# Patient Record
Sex: MALE | Race: BLACK OR AFRICAN AMERICAN | NOT HISPANIC OR LATINO | URBAN - METROPOLITAN AREA
[De-identification: names, ages, dates, MRNs, and addresses within clinical notes are randomized per-mention and may not be internally consistent; named-entity substitution may affect disease eponyms.]

---

## 2019-11-07 ENCOUNTER — EMERGENCY (EMERGENCY)
Facility: HOSPITAL | Age: 49
LOS: 1 days | Discharge: HOME | End: 2019-11-07
Admitting: EMERGENCY MEDICINE
Payer: COMMERCIAL

## 2019-11-07 VITALS
RESPIRATION RATE: 20 BRPM | DIASTOLIC BLOOD PRESSURE: 85 MMHG | OXYGEN SATURATION: 99 % | TEMPERATURE: 98 F | HEART RATE: 87 BPM | SYSTOLIC BLOOD PRESSURE: 150 MMHG

## 2019-11-07 PROCEDURE — 99282 EMERGENCY DEPT VISIT SF MDM: CPT

## 2019-11-07 NOTE — ED PROVIDER NOTE - CLINICAL SUMMARY MEDICAL DECISION MAKING FREE TEXT BOX
Pt's BP elevated in ED, repeat in exam room was 152/92. Pt offered to start low dose BP med such as novasc but declines at this time, states he will see his PMD. Pt educated regarding low salt healthy diet, weight loss/exercise and reducing alcohol intake. Pt states he has a vegetarian diet but does occasionally drink alcohol.

## 2019-11-07 NOTE — ED PROVIDER NOTE - OBJECTIVE STATEMENT
48 Y/O M denies PMH states that " I was here with somebody else and wanted to have my blood pressure checked." Pt denies CP SOB ABD PN N V D Dizz or any other acute symptoms or complaints. Pt states his blood pressure has always been normal, pt states he last saw his PMD in April.

## 2019-11-07 NOTE — ED PROVIDER NOTE - NSFOLLOWUPINSTRUCTIONS_ED_ALL_ED_FT
Your blood pressure was elevated while you were in the ER. Discuss this with your primary doctor. Advance activity as tolerated.  Continue all previously prescribed medications as directed.  Follow up with your primary care physician in 48-72 hours- bring copies of your results.  Return to the ER for worsening or persistent symptoms, and/or ANY NEW OR CONCERNING SYMPTOMS. If you have issues obtaining follow up, please call: [phone] to obtain a doctor or specialist who takes your insurance in your area.  You may call [phone] to make an appointment with the internal medicine clinic.

## 2019-11-07 NOTE — ED PROVIDER NOTE - PATIENT PORTAL LINK FT
You can access the FollowMyHealth Patient Portal offered by [facility] by registering at the following website: [email or web address]. By joining [employer]’s FollowMyHealth portal, you will also be able to view your health information using other applications (apps) compatible with our system.

## 2022-05-19 NOTE — ED ADULT TRIAGE NOTE - TEMPERATURE IN CELSIUS (DEGREES C)
Prescription approved per [facility] Refill Protocol.    Ana Luisa Montesinos RN  [facility]    
36.7

## 2023-02-11 ENCOUNTER — EMERGENCY (EMERGENCY)
Facility: HOSPITAL | Age: 53
LOS: 1 days | Discharge: HOME | End: 2023-02-11
Attending: EMERGENCY MEDICINE | Admitting: EMERGENCY MEDICINE
Payer: MEDICAID

## 2023-02-11 VITALS
TEMPERATURE: 98 F | RESPIRATION RATE: 16 BRPM | HEART RATE: 86 BPM | OXYGEN SATURATION: 100 % | DIASTOLIC BLOOD PRESSURE: 83 MMHG | SYSTOLIC BLOOD PRESSURE: 145 MMHG

## 2023-02-11 PROBLEM — Z78.9 OTHER SPECIFIED HEALTH STATUS: Chronic | Status: ACTIVE | Noted: 2019-11-07

## 2023-02-11 LAB
ALBUMIN SERPL ELPH-MCNC: 4.6 G/DL — SIGNIFICANT CHANGE UP (ref 3.3–5)
ALP SERPL-CCNC: 69 U/L — SIGNIFICANT CHANGE UP (ref 40–120)
ALT FLD-CCNC: 53 U/L — HIGH (ref 4–41)
ANION GAP SERPL CALC-SCNC: 10 MMOL/L — SIGNIFICANT CHANGE UP (ref 7–14)
AST SERPL-CCNC: 71 U/L — HIGH (ref 4–40)
BASOPHILS # BLD AUTO: 0.03 K/UL — SIGNIFICANT CHANGE UP (ref 0–0.2)
BASOPHILS NFR BLD AUTO: 0.7 % — SIGNIFICANT CHANGE UP (ref 0–2)
BILIRUB SERPL-MCNC: 0.4 MG/DL — SIGNIFICANT CHANGE UP (ref 0.2–1.2)
BUN SERPL-MCNC: 10 MG/DL — SIGNIFICANT CHANGE UP (ref 7–23)
CALCIUM SERPL-MCNC: 10 MG/DL — SIGNIFICANT CHANGE UP (ref 8.4–10.5)
CHLORIDE SERPL-SCNC: 106 MMOL/L — SIGNIFICANT CHANGE UP (ref 98–107)
CO2 SERPL-SCNC: 25 MMOL/L — SIGNIFICANT CHANGE UP (ref 22–31)
CREAT SERPL-MCNC: 0.98 MG/DL — SIGNIFICANT CHANGE UP (ref 0.5–1.3)
EGFR: 93 ML/MIN/1.73M2 — SIGNIFICANT CHANGE UP
EOSINOPHIL # BLD AUTO: 0.03 K/UL — SIGNIFICANT CHANGE UP (ref 0–0.5)
EOSINOPHIL NFR BLD AUTO: 0.7 % — SIGNIFICANT CHANGE UP (ref 0–6)
GLUCOSE SERPL-MCNC: 88 MG/DL — SIGNIFICANT CHANGE UP (ref 70–99)
HCT VFR BLD CALC: 45.7 % — SIGNIFICANT CHANGE UP (ref 39–50)
HGB BLD-MCNC: 15.3 G/DL — SIGNIFICANT CHANGE UP (ref 13–17)
IANC: 2.93 K/UL — SIGNIFICANT CHANGE UP (ref 1.8–7.4)
IMM GRANULOCYTES NFR BLD AUTO: 0.2 % — SIGNIFICANT CHANGE UP (ref 0–0.9)
LYMPHOCYTES # BLD AUTO: 0.94 K/UL — LOW (ref 1–3.3)
LYMPHOCYTES # BLD AUTO: 22.1 % — SIGNIFICANT CHANGE UP (ref 13–44)
MAGNESIUM SERPL-MCNC: 2.1 MG/DL — SIGNIFICANT CHANGE UP (ref 1.6–2.6)
MCHC RBC-ENTMCNC: 26.5 PG — LOW (ref 27–34)
MCHC RBC-ENTMCNC: 33.5 GM/DL — SIGNIFICANT CHANGE UP (ref 32–36)
MCV RBC AUTO: 79.1 FL — LOW (ref 80–100)
MONOCYTES # BLD AUTO: 0.31 K/UL — SIGNIFICANT CHANGE UP (ref 0–0.9)
MONOCYTES NFR BLD AUTO: 7.3 % — SIGNIFICANT CHANGE UP (ref 2–14)
NEUTROPHILS # BLD AUTO: 2.93 K/UL — SIGNIFICANT CHANGE UP (ref 1.8–7.4)
NEUTROPHILS NFR BLD AUTO: 69 % — SIGNIFICANT CHANGE UP (ref 43–77)
NRBC # BLD: 0 /100 WBCS — SIGNIFICANT CHANGE UP (ref 0–0)
NRBC # FLD: 0 K/UL — SIGNIFICANT CHANGE UP (ref 0–0)
PLATELET # BLD AUTO: 183 K/UL — SIGNIFICANT CHANGE UP (ref 150–400)
POTASSIUM SERPL-MCNC: 4 MMOL/L — SIGNIFICANT CHANGE UP (ref 3.5–5.3)
POTASSIUM SERPL-SCNC: 4 MMOL/L — SIGNIFICANT CHANGE UP (ref 3.5–5.3)
PROT SERPL-MCNC: 7.5 G/DL — SIGNIFICANT CHANGE UP (ref 6–8.3)
RBC # BLD: 5.78 M/UL — SIGNIFICANT CHANGE UP (ref 4.2–5.8)
RBC # FLD: 15 % — HIGH (ref 10.3–14.5)
SODIUM SERPL-SCNC: 141 MMOL/L — SIGNIFICANT CHANGE UP (ref 135–145)
TROPONIN T, HIGH SENSITIVITY RESULT: 8 NG/L — SIGNIFICANT CHANGE UP
TROPONIN T, HIGH SENSITIVITY RESULT: 8 NG/L — SIGNIFICANT CHANGE UP
WBC # BLD: 4.25 K/UL — SIGNIFICANT CHANGE UP (ref 3.8–10.5)
WBC # FLD AUTO: 4.25 K/UL — SIGNIFICANT CHANGE UP (ref 3.8–10.5)

## 2023-02-11 PROCEDURE — 71046 X-RAY EXAM CHEST 2 VIEWS: CPT | Mod: 26

## 2023-02-11 PROCEDURE — 99285 EMERGENCY DEPT VISIT HI MDM: CPT

## 2023-02-11 RX ORDER — SODIUM CHLORIDE 9 MG/ML
1000 INJECTION INTRAMUSCULAR; INTRAVENOUS; SUBCUTANEOUS ONCE
Refills: 0 | Status: COMPLETED | OUTPATIENT
Start: 2023-02-11 | End: 2023-02-11

## 2023-02-11 RX ADMIN — SODIUM CHLORIDE 1000 MILLILITER(S): 9 INJECTION INTRAMUSCULAR; INTRAVENOUS; SUBCUTANEOUS at 18:02

## 2023-02-11 NOTE — ED PROVIDER NOTE - PATIENT PORTAL LINK FT
You can access the FollowMyHealth Patient Portal offered by [facility] by registering at the following website: [email or web address]. By joining [employer]’s FollowMyHealth portal, you will also be able to view your health information using other applications (apps) compatible with our system.

## 2023-02-11 NOTE — ED PROVIDER NOTE - OBJECTIVE STATEMENT
52-year-old male with recent diagnosis of heart murmur currently on Holter monitor that was placed yesterday and scheduled to be removed in 2 days presents emergency room for generalized weakness and numbness and tingling in bilateral feet.  Patient denies headache, acute change in vision, slurred speech, one-sided weakness, facial droop, chest pain, shortness of breath, difficulty breathing, abdominal pain, nausea, vomiting, diarrhea or urinary complaints.  Patient states he is able to ambulate without difficulty.  Denies additional complaints at this time.

## 2023-02-11 NOTE — ED ADULT NURSE NOTE - NSIMPLEMENTINTERV_GEN_ALL_ED
Implemented All Universal Safety Interventions:  [place] to call system. Call bell, personal items and telephone within reach. Instruct patient to call for assistance. Room bathroom lighting operational. Non-slip footwear when patient is off stretcher. Physically safe environment: no spills, clutter or unnecessary equipment. Stretcher in lowest position, wheels locked, appropriate side rails in place.

## 2023-02-11 NOTE — ED PROVIDER NOTE - CLINICAL SUMMARY MEDICAL DECISION MAKING FREE TEXT BOX
Panchito: P/w lightheadedness after a Holter monitor was placed yest. 2/2 murmur. Feels (B) feet weakness and numb/tingle. No other neuro Sx. No CP/SOB. Alert and oriented, no gross motor or sensory deficits. Check electrolytes. EKG: No hyper-acute T waves, no malignant dysrhythmia, no ischemic ST segment changes. Check CXR (? complication of Holter). Check trop. Panchito: P/w lightheadedness after a Holter monitor was placed yest. 2/2 murmur. Feels (B) feet weakness and numb/tingle; that's happened in the past (w/ COVID), but worse now. No other neuro Sx. No CP/SOB. Alert and oriented, no gross motor or sensory deficits. Gait unremarkable. (B) 2+ DP pulses, warm feet. Check electrolytes. EKG: No hyper-acute T waves, no malignant dysrhythmia, no ischemic ST segment changes. Check CXR (? complication of Holter). Check trop. Panchito: P/w lightheadedness after a Holter monitor was placed yest. 2/2 murmur. Feels (B) feet weakness and numb/tingle; that's happened in the past (w/ COVID), but worse now. No other neuro Sx. No CP/SOB. Alert and oriented, no gross motor or sensory deficits. Gait unremarkable. (B) 2+ DP pulses, warm feet. Check electrolytes. EKG: No hyper-acute T waves, no malignant dysrhythmia, no ischemic ST segment changes. Check CXR (? complication of Holter). Check trop. This is most likely a peripheral neuropathy (has been on a diet) and not a vascular emergency. F/u w/ PCP for peripheral neuropathy w/u.

## 2023-02-11 NOTE — ED PROVIDER NOTE - NSFOLLOWUPINSTRUCTIONS_ED_ALL_ED_FT
Follow with PCP for peripheral neuropathy work-up. Consider these studies:  A1c  FABRICIO  B12  Calcium  CBC  CMP  CRP  ESR  Folate  Hepatitis B core antibody  Hepatitis B surface antibody  Hepatitis C antibody (if positive, Hep C viral load and Hep C genotype)  HIV  Lyme titer  Magnesium  Phosphorous  RPR  SPEP  TPPA (FTTP/MHA-TP)  TSH  UPEP  Urine heavy metal screen Follow with PCP for peripheral neuropathy work-up. Consider these studies:  A1c  FABRICIO  B12  Calcium  CBC  CMP  CRP  ESR  Folate  Hepatitis B core antibody  Hepatitis B surface antibody  Hepatitis C antibody (if positive, Hep C viral load and Hep C genotype)  HIV  Lyme titer  Magnesium  Phosphorous  RPR  SPEP  TPPA (FTTP/MHA-TP)  TSH  UPEP  Urine heavy metal screen    1. You presented to the emergency department for:  weakness and tingling in legs/feet    2. Your evaluation in the emergency department included a physician evaluation and testing consisting of: lab work and xray. Your work-up did not reveal any findings indicating the need for admission to the hospital or any emergent interventions at this time.     3. It is recommended that you follow-up with your primary care doctor within 4-6 days as discussed for a repeat evaluation, and potentially further testing and treatment.     If needed, to arrange an appointment with a primary care provider please call: [phone]    4. Please continue taking any regular medications as prescribed.     For pain you may take 500-1000mg Tylenol every 8 hours - as needed.  This is an over-the-counter medication - please read the instructions for use and warnings on the label. If you have any questions regarding its use, you may refer them to your local pharmacist.    5. PLEASE RETURN TO THE EMERGENCY DEPARTMENT IMMEDIATELY IF you develop any fevers not responding to over the counter medications, uncontrollable nausea and vomiting, an inability to tolerate eating and drinking, difficulty breathing, chest pain, a severe increase in your symptoms or pain, or any other new symptoms that concern you.

## 2023-02-11 NOTE — ED PROVIDER NOTE - ATTENDING APP SHARED VISIT CONTRIBUTION OF CARE
I performed a face-to-face evaluation of the patient and performed a history and physical examination. I agree with the history and physical examination. If this was a PA visit, I personally saw the patient with the PA and performed a substantive portion of the visit including all aspects of the medical decision making.    P/w lightheadedness after a Holter monitor was placed yest. 2/2 murmur. Feels (B) feet weakness and numb/tingle. No other neuro Sx. No CP/SOB. Alert and oriented, no gross motor or sensory deficits. Check electrolytes. EKG: No hyper-acute T waves, no malignant dysrhythmia, no ischemic ST segment changes. Check CXR (? complication of Holter). Check trop. I performed a face-to-face evaluation of the patient and performed a history and physical examination. I agree with the history and physical examination. If this was a PA visit, I personally saw the patient with the PA and performed a substantive portion of the visit including all aspects of the medical decision making.    P/w lightheadedness after a Holter monitor was placed yest. 2/2 murmur. Feels (B) feet weakness and numb/tingle; that's happened in the past (w/ COVID), but worse now. No other neuro Sx. No CP/SOB. Alert and oriented, no gross motor or sensory deficits. Gait unremarkable. (B) 2+ DP pulses, warm feet. Check electrolytes. EKG: No hyper-acute T waves, no malignant dysrhythmia, no ischemic ST segment changes. Check CXR (? complication of Holter). Check trop. I performed a face-to-face evaluation of the patient and performed a history and physical examination. I agree with the history and physical examination. If this was a PA visit, I personally saw the patient with the PA and performed a substantive portion of the visit including all aspects of the medical decision making.    P/w lightheadedness after a Holter monitor was placed yest. 2/2 murmur. Feels (B) feet weakness and numb/tingle; that's happened in the past (w/ COVID), but worse now. No other neuro Sx. No CP/SOB. Alert and oriented, no gross motor or sensory deficits. Gait unremarkable. (B) 2+ DP pulses, warm feet. Check electrolytes. EKG: No hyper-acute T waves, no malignant dysrhythmia, no ischemic ST segment changes. Check CXR (? complication of Holter). Check trop. This is most likely a peripheral neuropathy (has been on a diet) and not a vascular emergency. F/u w/ PCP for peripheral neuropathy w/u.

## 2023-02-11 NOTE — ED PROVIDER NOTE - PROGRESS NOTE DETAILS
Henry Colbert PGY2: Results thus far reviewed. No findings suggesting need for hospitalization or further emergent treatment. Results discussed with pt. Pt states that their symptoms have improved. They state they are comfortable with returning home with follow-up. Pt understands plan, and has been provided with return precautions, and understands reasons to return to the ER.

## 2023-02-11 NOTE — ED ADULT NURSE NOTE - OBJECTIVE STATEMENT
Pt A&Ox3 endorsing B/L foot numbess/weakness, and feeling lightheaded x1 day. Pt states he is currently wearing a heart monitor till Monday for outpatient cardiac work up. Pt denies CP, SOB, syncope or fall.

## 2023-02-11 NOTE — ED ADULT TRIAGE NOTE - CHIEF COMPLAINT QUOTE
weakness to both legs ,head  started last pm- states similar episode 3 wks ago- seen   other ed  with follow up at cardiologist- heart monitor in place through monday.  denies syncope.  .  at present c/o ch discomfort-   x past 3 wks. denies fever,cough  fs 129

## 2023-02-12 VITALS
RESPIRATION RATE: 16 BRPM | HEART RATE: 70 BPM | DIASTOLIC BLOOD PRESSURE: 75 MMHG | TEMPERATURE: 98 F | OXYGEN SATURATION: 100 % | SYSTOLIC BLOOD PRESSURE: 135 MMHG

## 2023-02-12 LAB — SARS-COV-2 RNA SPEC QL NAA+PROBE: SIGNIFICANT CHANGE UP

## 2023-03-18 ENCOUNTER — EMERGENCY (EMERGENCY)
Facility: HOSPITAL | Age: 53
LOS: 1 days | Discharge: HOME | End: 2023-03-18
Attending: EMERGENCY MEDICINE | Admitting: EMERGENCY MEDICINE
Payer: MEDICAID

## 2023-03-18 VITALS
DIASTOLIC BLOOD PRESSURE: 100 MMHG | HEART RATE: 98 BPM | TEMPERATURE: 99 F | OXYGEN SATURATION: 100 % | SYSTOLIC BLOOD PRESSURE: 149 MMHG | RESPIRATION RATE: 15 BRPM

## 2023-03-18 VITALS
HEART RATE: 86 BPM | DIASTOLIC BLOOD PRESSURE: 87 MMHG | TEMPERATURE: 98 F | SYSTOLIC BLOOD PRESSURE: 148 MMHG | OXYGEN SATURATION: 100 %

## 2023-03-18 PROCEDURE — 99284 EMERGENCY DEPT VISIT MOD MDM: CPT

## 2023-03-18 NOTE — ED ADULT TRIAGE NOTE - CHIEF COMPLAINT QUOTE
alert oriented c/o tingling in head back pain feeling intermittently faint  has weakness to both feet  has seen cardiologist and neurologist and was in [facility] ED 2 weeks ago with same s/s  no PMHx

## 2023-03-19 LAB
ALBUMIN SERPL ELPH-MCNC: 4.2 G/DL — SIGNIFICANT CHANGE UP (ref 3.3–5)
ALP SERPL-CCNC: 63 U/L — SIGNIFICANT CHANGE UP (ref 40–120)
ALT FLD-CCNC: 12 U/L — SIGNIFICANT CHANGE UP (ref 4–41)
ANION GAP SERPL CALC-SCNC: 11 MMOL/L — SIGNIFICANT CHANGE UP (ref 7–14)
AST SERPL-CCNC: 19 U/L — SIGNIFICANT CHANGE UP (ref 4–40)
BASOPHILS # BLD AUTO: 0.03 K/UL — SIGNIFICANT CHANGE UP (ref 0–0.2)
BASOPHILS NFR BLD AUTO: 0.9 % — SIGNIFICANT CHANGE UP (ref 0–2)
BILIRUB SERPL-MCNC: 0.4 MG/DL — SIGNIFICANT CHANGE UP (ref 0.2–1.2)
BUN SERPL-MCNC: 14 MG/DL — SIGNIFICANT CHANGE UP (ref 7–23)
CALCIUM SERPL-MCNC: 9.6 MG/DL — SIGNIFICANT CHANGE UP (ref 8.4–10.5)
CHLORIDE SERPL-SCNC: 103 MMOL/L — SIGNIFICANT CHANGE UP (ref 98–107)
CO2 SERPL-SCNC: 23 MMOL/L — SIGNIFICANT CHANGE UP (ref 22–31)
CREAT SERPL-MCNC: 1.04 MG/DL — SIGNIFICANT CHANGE UP (ref 0.5–1.3)
EGFR: 86 ML/MIN/1.73M2 — SIGNIFICANT CHANGE UP
EOSINOPHIL # BLD AUTO: 0.03 K/UL — SIGNIFICANT CHANGE UP (ref 0–0.5)
EOSINOPHIL NFR BLD AUTO: 0.9 % — SIGNIFICANT CHANGE UP (ref 0–6)
FLUAV AG NPH QL: SIGNIFICANT CHANGE UP
FLUBV AG NPH QL: SIGNIFICANT CHANGE UP
GLUCOSE SERPL-MCNC: 98 MG/DL — SIGNIFICANT CHANGE UP (ref 70–99)
HCT VFR BLD CALC: 42.9 % — SIGNIFICANT CHANGE UP (ref 39–50)
HGB BLD-MCNC: 14.3 G/DL — SIGNIFICANT CHANGE UP (ref 13–17)
IANC: 2.02 K/UL — SIGNIFICANT CHANGE UP (ref 1.8–7.4)
IMM GRANULOCYTES NFR BLD AUTO: 0.3 % — SIGNIFICANT CHANGE UP (ref 0–0.9)
LYMPHOCYTES # BLD AUTO: 1 K/UL — SIGNIFICANT CHANGE UP (ref 1–3.3)
LYMPHOCYTES # BLD AUTO: 29.8 % — SIGNIFICANT CHANGE UP (ref 13–44)
MCHC RBC-ENTMCNC: 26 PG — LOW (ref 27–34)
MCHC RBC-ENTMCNC: 33.3 GM/DL — SIGNIFICANT CHANGE UP (ref 32–36)
MCV RBC AUTO: 78.1 FL — LOW (ref 80–100)
MONOCYTES # BLD AUTO: 0.27 K/UL — SIGNIFICANT CHANGE UP (ref 0–0.9)
MONOCYTES NFR BLD AUTO: 8 % — SIGNIFICANT CHANGE UP (ref 2–14)
NEUTROPHILS # BLD AUTO: 2.02 K/UL — SIGNIFICANT CHANGE UP (ref 1.8–7.4)
NEUTROPHILS NFR BLD AUTO: 60.1 % — SIGNIFICANT CHANGE UP (ref 43–77)
NRBC # BLD: 0 /100 WBCS — SIGNIFICANT CHANGE UP (ref 0–0)
NRBC # FLD: 0 K/UL — SIGNIFICANT CHANGE UP (ref 0–0)
PLATELET # BLD AUTO: 172 K/UL — SIGNIFICANT CHANGE UP (ref 150–400)
POTASSIUM SERPL-MCNC: 3.9 MMOL/L — SIGNIFICANT CHANGE UP (ref 3.5–5.3)
POTASSIUM SERPL-SCNC: 3.9 MMOL/L — SIGNIFICANT CHANGE UP (ref 3.5–5.3)
PROT SERPL-MCNC: 6.8 G/DL — SIGNIFICANT CHANGE UP (ref 6–8.3)
RBC # BLD: 5.49 M/UL — SIGNIFICANT CHANGE UP (ref 4.2–5.8)
RBC # FLD: 13.9 % — SIGNIFICANT CHANGE UP (ref 10.3–14.5)
RSV RNA NPH QL NAA+NON-PROBE: SIGNIFICANT CHANGE UP
SARS-COV-2 RNA SPEC QL NAA+PROBE: SIGNIFICANT CHANGE UP
SODIUM SERPL-SCNC: 137 MMOL/L — SIGNIFICANT CHANGE UP (ref 135–145)
WBC # BLD: 3.36 K/UL — LOW (ref 3.8–10.5)
WBC # FLD AUTO: 3.36 K/UL — LOW (ref 3.8–10.5)

## 2023-03-19 NOTE — ED PROVIDER NOTE - PHYSICAL EXAMINATION
Gen: WDWN, NAD  HEENT: EOMI, no nasal discharge, mucous membranes moist  CV: RRR, 2+ radial pulses L  Resp: no accessory muscle use, no increased work of breathing  GI: Abdomen soft non-distended, NTTP  MSK: No open wounds, no bruising, no LE edema, no midline spinal ttp.  Neuro: A&Ox4, following commands, moving all four extremities spontaneously. 5/5 strength in b/l UE/LE.  Sensation intact bl in UE/LE. full strength dorsi and plantar flexion.   Psych: appropriate mood

## 2023-03-19 NOTE — ED PROVIDER NOTE - ATTENDING CONTRIBUTION TO CARE
The patient is a 52 y.o male who has a past medical history of heart murmur/[place] monitor Herniated discs currently awaiting neuro testing p/w paresthesias and weakness to the b/l LE.   Vital signs stable  PE: as documented, exceptions noted in chart    IMPRESSION: s/s suggest peripheral nerve impingment involving cervical Lumbar spine Pt to receive MRIs/other imaging as outpt RTED PRN if labs normal

## 2023-03-19 NOTE — ED PROVIDER NOTE - NSFOLLOWUPINSTRUCTIONS_ED_ALL_ED_FT
--today, the lab tests we did include basic blood tests, flu/covid swab. results significant for no abnormalities. we have included these test results in your paperwork. please take them to your follow-up appointment  --given that you were in the ED today, we recommend a followup visit with your general doctor (primary care doctor) within 7 days. AND get MRI within the next week as scheduled.   --your diagnosis is: paresthesias  --return to the ED if your new neuro symptoms - worsening/progression of weakness, if headaches.

## 2023-03-19 NOTE — ED ADULT NURSE NOTE - OBJECTIVE STATEMENT
54 y/o male with hx herniated discs and recent Holter monitor for dx of new heart murmur presents to the ED c/o paresthesias to head and B/LLE. Pt denies hx of DM. Denies all other symptoms. Pt A&Ox4 with respirations even/unlabored on room air. Skin warm/dry to touch; neurovascularly intact. No neuro deficits noted. Pt ambulates with steady gait.

## 2023-03-19 NOTE — ED ADULT NURSE NOTE - CHIEF COMPLAINT QUOTE
alert oriented c/o tingling in head back pain feeling intermittently faint  has weakness to both feet  has seen cardiologist and neurologist and was in [facility] ED 2 weeks ago with same s/s  no PMHx

## 2023-03-19 NOTE — ED PROVIDER NOTE - CLINICAL SUMMARY MEDICAL DECISION MAKING FREE TEXT BOX
Ruth Tomlin MD, PGY-3: 53YO M pmhx herniated discs, recent w/u with holter monitor for new cardiac murmur, p/w paresthesias and weakness to the b/l LE. vss, pe normal neuro exam. consider electrolyte issue, do not consider sciatica, diabetic neuropathy. presentation does not fit with a territory for stroke. plan for basic labs.

## 2023-03-19 NOTE — ED PROVIDER NOTE - PATIENT PORTAL LINK FT
You can access the FollowMyHealth Patient Portal offered by [facility] by registering at the following website: [email or web address]. By joining [employer]’s FollowMyHealth portal, you will also be able to view your health information using other applications (apps) compatible with our system.

## 2023-03-19 NOTE — ED PROVIDER NOTE - OBJECTIVE STATEMENT
51YO M pmhx herniated discs, recent w/u with holter monitor for new cardiac murmur, p/w multiple medical complaints.  Endorses weakness and paresthesias to the bilateral feet, onset weeks prior, although more frequent in episodes over the past few days.  Patient also endorses heaviness and paresthesias to the head, denies any focal neuro changes blurry vision, issues with walking, weakness to the arms, proximal legs.  Patient has no diabetes, has no distal neuropathy.  Patient has history of herniated disks, denies recent trauma, denies pain radiating from the back to the lower extremities.  Denies recent fevers, chills, cough, shortness of breath, abdominal pain, nausea, vomiting. pt scheduled for MRI next week with outpatient neurologist.

## 2023-07-15 ENCOUNTER — EMERGENCY (EMERGENCY)
Facility: HOSPITAL | Age: 53
LOS: 0 days | Discharge: HOME | End: 2023-07-16
Attending: EMERGENCY MEDICINE
Payer: MEDICAID

## 2023-07-15 VITALS
SYSTOLIC BLOOD PRESSURE: 137 MMHG | DIASTOLIC BLOOD PRESSURE: 89 MMHG | HEART RATE: 83 BPM | HEIGHT: 68 IN | RESPIRATION RATE: 16 BRPM | WEIGHT: 145.06 LBS | OXYGEN SATURATION: 98 % | TEMPERATURE: 98 F
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
## 2023-07-15 DIAGNOSIS — S61.210A LACERATION WITHOUT FOREIGN BODY OF RIGHT INDEX FINGER WITHOUT DAMAGE TO NAIL, INITIAL ENCOUNTER: ICD-10-CM
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
## 2023-07-15 DIAGNOSIS — Z23 ENCOUNTER FOR IMMUNIZATION: ICD-10-CM

## 2023-07-15 DIAGNOSIS — I10 ESSENTIAL (PRIMARY) HYPERTENSION: ICD-10-CM

## 2023-07-15 PROCEDURE — 99284 EMERGENCY DEPT VISIT MOD MDM: CPT | Mod: 25

## 2023-07-15 PROCEDURE — 12001 RPR S/N/AX/GEN/TRNK 2.5CM/<: CPT

## 2023-07-15 NOTE — ED ADULT TRIAGE NOTE - CHIEF COMPLAINT QUOTE
+ right index laceration sustained from cutting hair today 1.5 hours ago. 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

## 2023-07-16 VITALS
OXYGEN SATURATION: 99 % | HEART RATE: 71 BPM | RESPIRATION RATE: 16 BRPM | SYSTOLIC BLOOD PRESSURE: 144 MMHG | DIASTOLIC BLOOD PRESSURE: 86 MMHG

## 2023-07-16 PROCEDURE — 73130 X-RAY EXAM OF HAND: CPT | Mod: 26,RT

## 2023-07-16 RX ORDER — ACETAMINOPHEN 500 MG
2 TABLET ORAL
Qty: 40 | Refills: 0
Start: 2023-07-16 | End: 2023-07-20

## 2023-07-16 RX ORDER — ACETAMINOPHEN 500 MG
975 TABLET ORAL ONCE
Refills: 0 | Status: COMPLETED | OUTPATIENT
Start: 2023-07-16 | End: 2023-07-16

## 2023-07-16 RX ORDER — TETANUS TOXOID, REDUCED DIPHTHERIA TOXOID AND ACELLULAR PERTUSSIS VACCINE, ADSORBED 5; 2.5; 8; 8; 2.5 [IU]/.5ML; [IU]/.5ML; UG/.5ML; UG/.5ML; UG/.5ML
0.5 SUSPENSION INTRAMUSCULAR ONCE
Refills: 0 | Status: COMPLETED | OUTPATIENT
Start: 2023-07-16 | End: 2023-07-16

## 2023-07-16 RX ADMIN — Medication 1 TABLET(S): at 01:40

## 2023-07-16 RX ADMIN — TETANUS TOXOID, REDUCED DIPHTHERIA TOXOID AND ACELLULAR PERTUSSIS VACCINE, ADSORBED 0.5 MILLILITER(S): 5; 2.5; 8; 8; 2.5 SUSPENSION INTRAMUSCULAR at 02:34

## 2023-07-16 RX ADMIN — Medication 975 MILLIGRAM(S): at 00:58

## 2023-07-16 NOTE — ED PROVIDER NOTE - CLINICAL SUMMARY MEDICAL DECISION MAKING FREE TEXT BOX
53 yo M with R pointer finger laceration  -xr, although doubt FB  -antbx coverage, adacel booster, tylenol for pain  -repair and d/c with urgent hand f/u

## 2023-07-16 NOTE — ED PROVIDER NOTE - OBJECTIVE STATEMENT
53 yo M with R pointer finger laceration.  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  No other complaints.   ROS: negative for fever, cough, headache, chest pain, shortness of breath, abd pain, nausea, vomiting, diarrhea, rash, paresthesia, and weakness--all other systems reviewed are negative.   PMH: herniated discs, palpitations in past; Meds: Denies; SH: Denies smoking/drinking/drug use

## 2023-07-16 NOTE — ED ADULT NURSE NOTE - OBJECTIVE STATEMENT
Pt is c/o laceration to R pointer finger. 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 C/o 10/10 pain. Pulses and sensation present.

## 2023-07-16 NOTE — ED PROVIDER NOTE - PATIENT PORTAL LINK FT
You can access the FollowMyHealth Patient Portal offered by Health system by registering at the following website: [email or web address]. By joining [employer]’s FollowMyHealth portal, you will also be able to view your health information using other applications (apps) compatible with our system.

## 2023-07-16 NOTE — ED PROVIDER NOTE - PHYSICAL EXAMINATION
Vitals: HTN at 150/89, otherwise WNL  Gen: AAOx3, NAD, sitting comfortably in stretcher  Head: ncat, perrla, eomi b/l  Neck: supple, no lymphadenopathy, no midline deviation  Heart: rrr, no m/r/g  Lungs: CTA b/l, no rales/ronchi/wheezes  Abd: soft, nontender, non-distended, no rebound or guarding  Ext: no clubbing/cyanosis/edema, superficial laceration of R 2nd digit laterally adjacent but not involving nail bed, + oozing of blood, sensation intact in R 2nd digit, normal rom in all directions, no bone or tendon visible in wound   Neuro: sensation and muscle strength intact b/l, steady gait

## 2023-07-16 NOTE — ED PROVIDER NOTE - PROGRESS NOTE DETAILS
Results reported to patient--grossly benign, XR shows no FB or bony deformity   Pt. reports feeling better after meds/repair   pt. agrees to f/u with primary care outpt., hand referral given urgently for f/u (pt. will call office on monday for f/u appt)  pt. understands to return to ED if symptoms worsen; will d/c with antbx prophylaxis and tylenol

## 2023-07-16 NOTE — ED PROVIDER NOTE - CARE PROVIDER_API CALL
Savana Alvarado  Plastic Surgery  [address] [place] [address], [address]  [place], [place] [id number]  Phone: [phone]  Fax: [phone]  Follow Up Time: Urgent

## 2023-07-16 NOTE — ED ADULT NURSE NOTE - NSFALLUNIVINTERV_ED_ALL_ED
Bed/Stretcher in lowest position, wheels locked, appropriate side rails in place/Call bell, personal items and telephone in reach/Instruct patient to call for assistance before getting out of bed/chair/stretcher/Non-slip footwear applied when patient is off stretcher/[place] to call system/Physically safe environment - no spills, clutter or unnecessary equipment/Purposeful proactive rounding/Room/bathroom lighting operational, light cord in reach

## 2023-10-23 NOTE — ED ADULT TRIAGE NOTE - HISTORY OF COVID-19 VACCINATION
Acute on chronic. On home O2 3L. Imaging shows no evidence of pneumonia or pleural effusion, just mild interstitial edema and chronic collapse of the trachea. Strep pneumo and legionella negative    No overnight events. Patient notes improvement in SOB but still complaining of cough with exertion and fatigue. Sat 98% on 3L .      Plan:   Changed IV Solu-medrol 40 mg Q12H  Completed Azithromycin 500mg IV (10/22 - 10/24)   Continue home inhaler regimen  Continue Duoneb Q6H  Continue PRN albuterol nebulizer Q6H for wheezing and SOB  Continue Mucinex BID  Monitor O2 and titrate as needed to keep SpO2 88-92% No

## 2023-12-29 NOTE — ED PROVIDER NOTE - NEUROLOGICAL SPEECH
Spoke to patient to schedule procedure(s) Colonoscopy       Physician to perform procedure(s) Dr. BETTY Elizabeth  Date of Procedure (s) 4/1/24  Arrival Time 12:00 PM  Time of Procedure(s) 1:00 PM   Location of Procedure(s) [facility] 2nd Floor  Type of Rx Prep sent to patient: PEG  Instructions provided to patient via Postal Mail and [facility]    Patient was informed on the following information and verbalized understanding. Screening questionnaire reviewed with patient and complete. If procedure requires anesthesia, a responsible adult needs to be present to accompany the patient home, patient cannot drive after receiving anesthesia. Appointment details are tentative, especially check-in time. Patient will receive a prep-op call 7 days prior to confirm check-in time for procedure. If applicable the patient should contact their pharmacy to verify Rx for procedure prep is ready for pick-up. Patient was advised to call the scheduling department at [phone] if pharmacy states no Rx is available. Patient was advised to call the endoscopy scheduling department if any questions or concerns arise.      [facility] Scheduling Department      
clear